# Patient Record
Sex: MALE | Employment: OTHER | URBAN - METROPOLITAN AREA
[De-identification: names, ages, dates, MRNs, and addresses within clinical notes are randomized per-mention and may not be internally consistent; named-entity substitution may affect disease eponyms.]

---

## 2021-12-26 ENCOUNTER — HOSPITAL ENCOUNTER (OUTPATIENT)
Age: 36
Discharge: HOME | End: 2021-12-26

## 2021-12-26 VITALS
SYSTOLIC BLOOD PRESSURE: 109 MMHG | WEIGHT: 170 LBS | OXYGEN SATURATION: 100 % | HEART RATE: 85 BPM | TEMPERATURE: 99 F | DIASTOLIC BLOOD PRESSURE: 68 MMHG | RESPIRATION RATE: 18 BRPM

## 2021-12-26 DIAGNOSIS — U07.1 COVID: ICD-10-CM

## 2021-12-26 DIAGNOSIS — Z20.822 ENCOUNTER FOR LABORATORY TESTING FOR COVID-19 VIRUS: Primary | ICD-10-CM

## 2021-12-26 PROCEDURE — 99213 OFFICE O/P EST LOW 20 MIN: CPT | Performed by: NURSE PRACTITIONER

## 2021-12-26 PROCEDURE — U0002 COVID-19 LAB TEST NON-CDC: HCPCS | Performed by: NURSE PRACTITIONER

## 2021-12-26 PROCEDURE — 87880 STREP A ASSAY W/OPTIC: CPT | Performed by: NURSE PRACTITIONER

## 2021-12-26 NOTE — ED PROVIDER NOTES
Patient Seen in: Immediate Care Day      History   No chief complaint on file.     Stated Complaint: sore throat, fever, headache, cough    Subjective:   HPI    40 Yo arrives to the ic with co sore throat, tolerating secretions, phonation normal, neck Pupils: Pupils are equal, round, and reactive to light. Pulmonary:      Effort: No tachypnea or respiratory distress. Breath sounds: No wheezing. Musculoskeletal:         General: Normal range of motion.       Cervical back: Full passive range of m historian. Physical exam remained stable over serial reexaminations as previously documented. Results reviewed with patient .      I have given both verbal and written discharge instructions regarding their diagnoses, expectations, follow up, and ER testing for COVID-19 virus  (primary encounter diagnosis)  COVID     Disposition:  Discharge  12/26/2021  2:46 pm    Follow-up:  No follow-up provider specified. Medications Prescribed:  There are no discharge medications for this patient.